# Patient Record
Sex: FEMALE | Race: WHITE | Employment: UNEMPLOYED | URBAN - NONMETROPOLITAN AREA
[De-identification: names, ages, dates, MRNs, and addresses within clinical notes are randomized per-mention and may not be internally consistent; named-entity substitution may affect disease eponyms.]

---

## 2019-08-30 ENCOUNTER — HOSPITAL ENCOUNTER (EMERGENCY)
Age: 34
Discharge: HOME | End: 2019-08-30
Attending: EMERGENCY MEDICINE
Payer: COMMERCIAL

## 2019-08-30 VITALS
TEMPERATURE: 98.8 F | DIASTOLIC BLOOD PRESSURE: 89 MMHG | HEART RATE: 147 BPM | WEIGHT: 140 LBS | OXYGEN SATURATION: 97 % | SYSTOLIC BLOOD PRESSURE: 153 MMHG | HEIGHT: 63 IN | BODY MASS INDEX: 24.8 KG/M2 | RESPIRATION RATE: 16 BRPM

## 2019-08-30 DIAGNOSIS — R00.2 PALPITATIONS: ICD-10-CM

## 2019-08-30 DIAGNOSIS — T76.21XA SUSPECTED ADULT SEXUAL ABUSE, INITIAL ENCOUNTER: Primary | ICD-10-CM

## 2019-08-30 DIAGNOSIS — T50.904A INGESTION OF UNKNOWN DRUG, UNDETERMINED INTENT, INITIAL ENCOUNTER: ICD-10-CM

## 2019-08-30 LAB
AMPHETAMINE+METHAMPHETAMINE URINE SCREEN: NORMAL
BACTERIA: NORMAL /HPF
BARBITURATE QUANTITATIVE URINE: NEGATIVE
BENZODIAZEPINE QUANTITATIVE URINE: NEGATIVE
BILIRUBIN URINE: NEGATIVE
BLOOD, URINE: NEGATIVE
CANNABINOID QUANTITATIVE URINE: NEGATIVE
CASTS 2: NORMAL /LPF
CASTS UA: NORMAL /LPF
CHARACTER, URINE: CLEAR
COCAINE METABOLITE QUANTITATIVE URINE: NEGATIVE
COLOR: YELLOW
CRYSTALS, UA: NORMAL
EKG ATRIAL RATE: 138 BPM
EKG P AXIS: 81 DEGREES
EKG P-R INTERVAL: 132 MS
EKG Q-T INTERVAL: 286 MS
EKG QRS DURATION: 70 MS
EKG QTC CALCULATION (BAZETT): 433 MS
EKG R AXIS: 78 DEGREES
EKG T AXIS: 61 DEGREES
EKG VENTRICULAR RATE: 138 BPM
EPITHELIAL CELLS, UA: NORMAL /HPF
ETHYL ALCOHOL, SERUM: 0.04 %
GLUCOSE URINE: NEGATIVE MG/DL
KETONES, URINE: NEGATIVE
LEUKOCYTE ESTERASE, URINE: NEGATIVE
MISCELLANEOUS 2: NORMAL
NITRITE, URINE: NEGATIVE
OPIATES, URINE: NORMAL
OXYCODONE: NEGATIVE
PH UA: 7.5 (ref 5–9)
PHENCYCLIDINE QUANTITATIVE URINE: NEGATIVE
PREGNANCY, SERUM: NEGATIVE
PROTEIN UA: NEGATIVE
RBC URINE: NORMAL /HPF
RENAL EPITHELIAL, UA: NORMAL
SPECIFIC GRAVITY, URINE: 1.02 (ref 1–1.03)
UROBILINOGEN, URINE: 0.2 EU/DL (ref 0–1)
WBC UA: NORMAL /HPF
YEAST: NORMAL

## 2019-08-30 PROCEDURE — 6370000000 HC RX 637 (ALT 250 FOR IP): Performed by: FAMILY MEDICINE

## 2019-08-30 PROCEDURE — 36415 COLL VENOUS BLD VENIPUNCTURE: CPT

## 2019-08-30 PROCEDURE — 81001 URINALYSIS AUTO W/SCOPE: CPT

## 2019-08-30 PROCEDURE — 2500000003 HC RX 250 WO HCPCS: Performed by: FAMILY MEDICINE

## 2019-08-30 PROCEDURE — 80305 DRUG TEST PRSMV DIR OPT OBS: CPT

## 2019-08-30 PROCEDURE — 84703 CHORIONIC GONADOTROPIN ASSAY: CPT

## 2019-08-30 PROCEDURE — 6360000002 HC RX W HCPCS: Performed by: FAMILY MEDICINE

## 2019-08-30 PROCEDURE — 93010 ELECTROCARDIOGRAM REPORT: CPT | Performed by: NUCLEAR MEDICINE

## 2019-08-30 PROCEDURE — 6370000000 HC RX 637 (ALT 250 FOR IP): Performed by: EMERGENCY MEDICINE

## 2019-08-30 PROCEDURE — 96372 THER/PROPH/DIAG INJ SC/IM: CPT

## 2019-08-30 PROCEDURE — 2720000011 HC SANE KIT SUPPLY STERILE

## 2019-08-30 PROCEDURE — G0480 DRUG TEST DEF 1-7 CLASSES: HCPCS

## 2019-08-30 PROCEDURE — 99284 EMERGENCY DEPT VISIT MOD MDM: CPT

## 2019-08-30 PROCEDURE — 93005 ELECTROCARDIOGRAM TRACING: CPT | Performed by: FAMILY MEDICINE

## 2019-08-30 RX ORDER — AZITHROMYCIN 250 MG/1
2000 TABLET, FILM COATED ORAL ONCE
Status: COMPLETED | OUTPATIENT
Start: 2019-08-30 | End: 2019-08-30

## 2019-08-30 RX ORDER — LEVONORGESTREL 1.5 MG/1
1.5 TABLET ORAL ONCE
Status: COMPLETED | OUTPATIENT
Start: 2019-08-30 | End: 2019-08-30

## 2019-08-30 RX ORDER — ACETAMINOPHEN 325 MG/1
650 TABLET ORAL ONCE
Status: COMPLETED | OUTPATIENT
Start: 2019-08-30 | End: 2019-08-30

## 2019-08-30 RX ORDER — ONDANSETRON 4 MG/1
4 TABLET, ORALLY DISINTEGRATING ORAL ONCE
Status: COMPLETED | OUTPATIENT
Start: 2019-08-30 | End: 2019-08-30

## 2019-08-30 RX ORDER — LEVOTHYROXINE SODIUM 0.12 MG/1
125 TABLET ORAL DAILY
COMMUNITY

## 2019-08-30 RX ADMIN — LIDOCAINE HYDROCHLORIDE 250 MG: 10 INJECTION, SOLUTION EPIDURAL; INFILTRATION; INTRACAUDAL; PERINEURAL at 09:21

## 2019-08-30 RX ADMIN — ACETAMINOPHEN 650 MG: 325 TABLET ORAL at 08:06

## 2019-08-30 RX ADMIN — ONDANSETRON 4 MG: 4 TABLET, ORALLY DISINTEGRATING ORAL at 08:05

## 2019-08-30 RX ADMIN — AZITHROMYCIN MONOHYDRATE 2000 MG: 250 TABLET ORAL at 09:16

## 2019-08-30 RX ADMIN — LEVONORGESTREL 1.5 MG: 1.5 TABLET ORAL at 09:16

## 2019-08-30 ASSESSMENT — ENCOUNTER SYMPTOMS
NAUSEA: 0
COUGH: 0
VOMITING: 0
SHORTNESS OF BREATH: 0
ABDOMINAL PAIN: 1
ABDOMINAL DISTENTION: 0
DIARRHEA: 0
WHEEZING: 0
EYE DISCHARGE: 0
EYE ITCHING: 0
RHINORRHEA: 0

## 2019-08-30 ASSESSMENT — PAIN DESCRIPTION - PAIN TYPE: TYPE: ACUTE PAIN

## 2019-08-30 ASSESSMENT — PAIN SCALES - GENERAL
PAINLEVEL_OUTOF10: 2
PAINLEVEL_OUTOF10: 8

## 2019-08-30 ASSESSMENT — PAIN DESCRIPTION - ORIENTATION: ORIENTATION: LOWER

## 2019-08-30 ASSESSMENT — PAIN DESCRIPTION - LOCATION: LOCATION: ABDOMEN

## 2019-08-30 ASSESSMENT — PAIN DESCRIPTION - DESCRIPTORS: DESCRIPTORS: CRAMPING

## 2019-08-30 NOTE — ED PROVIDER NOTES
The patient was seen by Dr. Chava Huynh    Refer to his note for complete history and physical.    The patient was noted to have tachycardia and her EKG did show sinus tachycardia at 138    Serum pregnancy test was negative    EtOH 0.04 at 0821    Urine drug screen positive for opiates and amphetamines    Patient denies taking opiates or amphetamines    It is unclear how those entered her system    amphetamines can explain her tachycardia    Was seen and evaluated by a [facility] nurse examiner.     Rape kit was completed per protocol    She was medicated with Rocephin plus Zithromax for postexposure prophylaxis per protocol    Given Plan B for post exposure pregnancy prevention per protocol    Discharged        Elsa Farrell MD  08/30/19 8888
and Thyroid disease. SURGICAL HISTORY      has a past surgical history that includes Ovary removal (Left) and Tonsillectomy. CURRENT MEDICATIONS       Previous Medications    LEVOTHYROXINE (SYNTHROID) 125 MCG TABLET    Take 125 mcg by mouth Daily       ALLERGIES     is allergic to zantac [ranitidine hcl]. FAMILY HISTORY     has no family status information on file. family history is not on file. SOCIAL HISTORY      reports that she has never smoked. She does not have any smokeless tobacco history on file. She reports that she drinks alcohol. She reports that she does not use drugs. PHYSICAL EXAM     INITIAL VITALS:  height is 5' 3\" (1.6 m) and weight is 140 lb (63.5 kg). Her oral temperature is 98.8 °F (37.1 °C). Her blood pressure is 135/92 (abnormal) and her pulse is 152. Her respiration is 16 and oxygen saturation is 99%. Physical Exam   Constitutional: She is oriented to person, place, and time. She appears well-developed and well-nourished. HENT:   Head: Normocephalic and atraumatic. Eyes: Pupils are equal, round, and reactive to light. Right eye exhibits no discharge. Left eye exhibits no discharge. No scleral icterus. Neck: Normal range of motion. Neck supple. No tracheal deviation present. Cardiovascular: Regular rhythm and normal heart sounds. Exam reveals no gallop and no friction rub. No murmur heard. tachycardiac   Pulmonary/Chest: Effort normal. No stridor. No respiratory distress. She has no wheezes. Abdominal: Soft. There is no tenderness. There is no rebound and no guarding. Genitourinary:   Genitourinary Comments: See [facility] nurse note for details. Musculoskeletal: She exhibits no edema or tenderness. Neurological: She is alert and oriented to person, place, and time. No cranial nerve deficit. Coordination normal.   Skin: Skin is warm and dry. She is not diaphoretic.    Psychiatric: Her behavior is normal. Judgment and thought content normal.   anxious   Nursing